# Patient Record
Sex: FEMALE | Race: WHITE | URBAN - METROPOLITAN AREA
[De-identification: names, ages, dates, MRNs, and addresses within clinical notes are randomized per-mention and may not be internally consistent; named-entity substitution may affect disease eponyms.]

---

## 2018-06-08 ENCOUNTER — POST-OPERATIVE VISIT (OUTPATIENT)
Dept: URBAN - METROPOLITAN AREA CLINIC 88 | Facility: CLINIC | Age: 78
End: 2018-06-08

## 2018-06-08 PROCEDURE — 99024 POSTOP FOLLOW-UP VISIT: CPT | Performed by: OPTOMETRIST

## 2018-06-08 ASSESSMENT — INTRAOCULAR PRESSURE
OD: 13
OS: 14

## 2018-06-22 ENCOUNTER — POST-OPERATIVE VISIT (OUTPATIENT)
Dept: URBAN - METROPOLITAN AREA CLINIC 88 | Facility: CLINIC | Age: 78
End: 2018-06-22

## 2018-06-22 DIAGNOSIS — Z09 ENCNTR FOR F/U EXAM AFT TRTMT FOR COND OTH THAN MALIG NEOPLM: Primary | ICD-10-CM

## 2018-06-22 PROCEDURE — 99024 POSTOP FOLLOW-UP VISIT: CPT | Performed by: OPTOMETRIST

## 2018-06-22 ASSESSMENT — INTRAOCULAR PRESSURE
OS: 14
OD: 14

## 2018-06-22 ASSESSMENT — VISUAL ACUITY
OD: 20/60
OS: 20/30+2

## 2018-07-26 ENCOUNTER — OFFICE VISIT (OUTPATIENT)
Dept: URBAN - METROPOLITAN AREA CLINIC 88 | Facility: CLINIC | Age: 78
End: 2018-07-26
Payer: COMMERCIAL

## 2018-07-26 DIAGNOSIS — H02.052 TRICHIASIS WITHOUT ENTROPION RIGHT LOWER EYELID: Primary | ICD-10-CM

## 2018-07-26 PROCEDURE — 67820 REVISE EYELASHES: CPT | Performed by: OPTOMETRIST

## 2018-07-26 ASSESSMENT — INTRAOCULAR PRESSURE
OD: 12
OS: 12

## 2018-07-26 NOTE — IMPRESSION/PLAN
Impression: Trichiasis without entropion right lower eyelid: H02.052. Plan: Discussed status with patient. With consent removal of lashes in office with forceps and without incident. 1 gtt Ofloxacin OD in office today. No complications observed. RTC if dry, redness or irritation experienced.

## 2018-09-27 ENCOUNTER — OFFICE VISIT (OUTPATIENT)
Dept: URBAN - METROPOLITAN AREA CLINIC 88 | Facility: CLINIC | Age: 78
End: 2018-09-27
Payer: COMMERCIAL

## 2018-09-27 DIAGNOSIS — H43.393 OTHER VITREOUS OPACITIES, BILATERAL: ICD-10-CM

## 2018-09-27 DIAGNOSIS — H52.13 MYOPIA, BILATERAL: ICD-10-CM

## 2018-09-27 DIAGNOSIS — H01.8 OTHER SPECIFIED INFLAMMATIONS OF EYELID: ICD-10-CM

## 2018-09-27 PROCEDURE — 99214 OFFICE O/P EST MOD 30 MIN: CPT | Performed by: OPTOMETRIST

## 2018-09-27 PROCEDURE — 92134 CPTRZ OPH DX IMG PST SGM RTA: CPT | Performed by: OPTOMETRIST

## 2018-09-27 ASSESSMENT — INTRAOCULAR PRESSURE
OS: 12
OD: 12

## 2018-09-27 ASSESSMENT — VISUAL ACUITY
OS: 20/25
OD: 20/50

## 2018-09-27 NOTE — IMPRESSION/PLAN
Impression: Other specified inflammations of eyelid: H01.8. Plan: Discussed cause of ocular irritation with patient in detail. Lid hygiene to be performed with baby shampoo and a soft cloth to clean the lash and lid margin. Patient understands this is a chronic condition that will require daily lid cleaning.

## 2018-09-27 NOTE — IMPRESSION/PLAN
Impression: Myopia, bilateral: H52.13. Plan: Reviewed refractive prescription in detail with patient and need for glasses to improve vision. Release spectacle prescription at this time.  MVD form filled out at patient request.

## 2018-09-27 NOTE — IMPRESSION/PLAN
Impression: Puckering of macula, right eye: H35.371. Plan: A detailed explanation of the condition was provided to the patient. Monitor at this time as surgery is not recommended. OCT macula OU performed today, ERM OD. Discussed referral to retina, pt deferred today. Patient knows to return to clinic if vision begins to decrease prior to their next scheduled examination.

## 2019-04-26 ENCOUNTER — OFFICE VISIT (OUTPATIENT)
Dept: URBAN - METROPOLITAN AREA CLINIC 88 | Facility: CLINIC | Age: 79
End: 2019-04-26
Payer: COMMERCIAL

## 2019-04-26 PROCEDURE — 92134 CPTRZ OPH DX IMG PST SGM RTA: CPT | Performed by: OPTOMETRIST

## 2019-04-26 PROCEDURE — 99214 OFFICE O/P EST MOD 30 MIN: CPT | Performed by: OPTOMETRIST

## 2019-04-26 ASSESSMENT — INTRAOCULAR PRESSURE
OS: 13
OD: 13

## 2019-04-26 NOTE — IMPRESSION/PLAN
Impression: Puckering of macula, right eye: H35.371. Plan: A detailed explanation of the condition was provided to the patient. Monitor at this time as surgery is not recommended. OCT macula OU performed today, ERM OD. Discussed referral to retina, pt deferred today. Patient knows to return to clinic if vision begins to decrease prior to their next scheduled examination.  RTC 9 months for dilated exam and OCT macula and ON

## 2020-03-10 ENCOUNTER — OFFICE VISIT (OUTPATIENT)
Dept: URBAN - METROPOLITAN AREA CLINIC 88 | Facility: CLINIC | Age: 80
End: 2020-03-10
Payer: COMMERCIAL

## 2020-03-10 DIAGNOSIS — H40.013 OPEN ANGLE WITH BORDERLINE FINDINGS, LOW RISK, BILATERAL: ICD-10-CM

## 2020-03-10 DIAGNOSIS — H43.813 VITREOUS DEGENERATION, BILATERAL: ICD-10-CM

## 2020-03-10 PROCEDURE — 92134 CPTRZ OPH DX IMG PST SGM RTA: CPT | Performed by: OPTOMETRIST

## 2020-03-10 PROCEDURE — 99214 OFFICE O/P EST MOD 30 MIN: CPT | Performed by: OPTOMETRIST

## 2020-03-10 ASSESSMENT — INTRAOCULAR PRESSURE
OD: 14
OS: 14

## 2020-03-10 NOTE — IMPRESSION/PLAN
Impression: Open angle with borderline findings, low risk, bilateral: H40.013. Plan: Discussed status of examination with patient. OCT ON today, thin superior stable vs 2010. Recommend glaucoma workup with VF 24-2, and Pachy. Patient understands risk associated with condition and need for monitoring.

## 2020-04-20 NOTE — IMPRESSION/PLAN
Impression: Puckering of macula, right eye: H35.371. Plan: A detailed explanation of the condition was provided to the patient. Monitor at this time as surgery is not recommended. OCT macula OU performed today, ERM OD. Discussed referral to retina, pt deferred today. Patient knows to return to clinic if vision begins to decrease prior to their next scheduled examination.  RTC 1 year for dilated exam and OCT macula and ON Ambulatory

## 2020-05-29 ENCOUNTER — OFFICE VISIT (OUTPATIENT)
Dept: URBAN - METROPOLITAN AREA CLINIC 88 | Facility: CLINIC | Age: 80
End: 2020-05-29
Payer: COMMERCIAL

## 2020-05-29 PROCEDURE — 92020 GONIOSCOPY: CPT | Performed by: OPTOMETRIST

## 2020-05-29 PROCEDURE — 99213 OFFICE O/P EST LOW 20 MIN: CPT | Performed by: OPTOMETRIST

## 2020-05-29 PROCEDURE — 92133 CPTRZD OPH DX IMG PST SGM ON: CPT | Performed by: OPTOMETRIST

## 2020-05-29 PROCEDURE — 76514 ECHO EXAM OF EYE THICKNESS: CPT | Performed by: OPTOMETRIST

## 2020-05-29 PROCEDURE — 92083 EXTENDED VISUAL FIELD XM: CPT | Performed by: OPTOMETRIST

## 2020-05-29 RX ORDER — LATANOPROST 50 UG/ML
0.005 % SOLUTION OPHTHALMIC
Qty: 10 | Refills: 0 | Status: INACTIVE
Start: 2020-05-29 | End: 2021-02-01

## 2020-05-29 RX ORDER — LATANOPROST 50 UG/ML
0.005 % SOLUTION OPHTHALMIC
Qty: 1 | Refills: 1 | Status: INACTIVE
Start: 2020-05-29 | End: 2020-05-29

## 2020-05-29 ASSESSMENT — INTRAOCULAR PRESSURE
OD: 15
OS: 11
OS: 15
OD: 11

## 2020-05-29 ASSESSMENT — VISUAL ACUITY
OS: 20/25
OD: 20/40

## 2020-05-29 NOTE — IMPRESSION/PLAN
Impression: Presence of pseudophakia: Z96.1. Plan: Monitor, stable. Glasses Rx at next visit when IOP checked.

## 2020-05-29 NOTE — IMPRESSION/PLAN
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 Plan: Discussed status of examination with patient. OCT ON today, thin superior. VF 24-2 inf loss with large ERM (difficult to determine stability secondary to retina status) OD, shallow nasal step pattern OS. Repeat VF needed. Pachy, thin OU. To decrease risk will start Latanoprost QHS OU (potential side effects discussed). Target IOP 10 OU. Patient understands risk associated with condition and need for monitoring.

## 2020-06-25 ENCOUNTER — OFFICE VISIT (OUTPATIENT)
Dept: URBAN - METROPOLITAN AREA CLINIC 88 | Facility: CLINIC | Age: 80
End: 2020-06-25
Payer: COMMERCIAL

## 2020-06-25 DIAGNOSIS — Z96.1 PRESENCE OF PSEUDOPHAKIA: ICD-10-CM

## 2020-06-25 PROCEDURE — 92012 INTRM OPH EXAM EST PATIENT: CPT | Performed by: OPTOMETRIST

## 2020-06-25 ASSESSMENT — INTRAOCULAR PRESSURE
OD: 13
OS: 9
OS: 13
OD: 9

## 2020-06-25 ASSESSMENT — VISUAL ACUITY
OS: 20/30
OD: 20/50

## 2020-06-25 NOTE — IMPRESSION/PLAN
Impression: Low-tension glaucoma, right eye, severe stage: H07.5035. Plan: Discussed status of examination with patient. OCT ON, thin superior. VF 24-2 inf loss with large ERM (difficult to determine stability secondary to retina status) OD, shallow nasal step pattern OS. Repeat VF needed. Pachy, thin OU. To decrease risk will continue Latanoprost QHS OU target IOP 10 OU, met today. Patient understands risk associated with condition and need for monitoring.

## 2020-08-13 ENCOUNTER — OFFICE VISIT (OUTPATIENT)
Dept: URBAN - METROPOLITAN AREA CLINIC 88 | Facility: CLINIC | Age: 80
End: 2020-08-13
Payer: COMMERCIAL

## 2020-08-13 PROCEDURE — 99213 OFFICE O/P EST LOW 20 MIN: CPT | Performed by: OPTOMETRIST

## 2020-08-13 ASSESSMENT — INTRAOCULAR PRESSURE
OS: 9
OD: 9

## 2020-08-13 NOTE — IMPRESSION/PLAN
Impression: Low-tension glaucoma, right eye, severe stage: K17.8847. Plan: Discussed status of examination with patient. OCT ON, thin superior. VF 24-2 inf loss with large ERM (difficult to determine stability secondary to retina status) OD, shallow nasal step pattern OS. Repeat VF needed. Pachy, thin OU. To decrease risk will continue Latanoprost QHS OU target IOP 10 OU, met today. Patient understands risk associated with condition and need for monitoring.  MVD form filled out at pt request.

## 2020-11-20 ENCOUNTER — OFFICE VISIT (OUTPATIENT)
Dept: URBAN - METROPOLITAN AREA CLINIC 88 | Facility: CLINIC | Age: 80
End: 2020-11-20
Payer: COMMERCIAL

## 2020-11-20 PROCEDURE — 99213 OFFICE O/P EST LOW 20 MIN: CPT | Performed by: OPTOMETRIST

## 2020-11-20 PROCEDURE — 92134 CPTRZ OPH DX IMG PST SGM RTA: CPT | Performed by: OPTOMETRIST

## 2020-11-20 ASSESSMENT — INTRAOCULAR PRESSURE
OD: 9
OD: 10
OS: 11
OS: 9

## 2020-11-20 NOTE — IMPRESSION/PLAN
Impression: Low-tension glaucoma, right eye, severe stage: E01.8039. Plan: Discussed status of examination with patient. OCT ON, thin superior. VF 24-2 inf loss with large ERM (difficult to determine stability secondary to retina status) OD, shallow nasal step pattern OS. Repeat VF needed. Pachy, thin OU. To decrease risk will continue Latanoprost QHS OU target IOP 10 OU, met today. Patient understands risk associated with condition and need for monitoring.

## 2020-11-20 NOTE — IMPRESSION/PLAN
Impression: Puckering of macula, right eye: H35.371. Plan: A detailed explanation of the condition was provided to the patient. Monitor at this time as surgery is not recommended. OCT macula OU performed today, ERM OD slight progression, no symptoms. Discussed referral to retina, pt deferred today. Patient knows to return to clinic if vision begins to decrease prior to their next scheduled examination.

## 2021-02-26 ENCOUNTER — OFFICE VISIT (OUTPATIENT)
Dept: URBAN - METROPOLITAN AREA CLINIC 88 | Facility: CLINIC | Age: 81
End: 2021-02-26
Payer: COMMERCIAL

## 2021-02-26 PROCEDURE — 92133 CPTRZD OPH DX IMG PST SGM ON: CPT | Performed by: OPTOMETRIST

## 2021-02-26 PROCEDURE — 99214 OFFICE O/P EST MOD 30 MIN: CPT | Performed by: OPTOMETRIST

## 2021-02-26 ASSESSMENT — INTRAOCULAR PRESSURE
OS: 11
OD: 11

## 2021-02-26 NOTE — IMPRESSION/PLAN
Impression: Low-tension glaucoma, right eye, severe stage: Q33.7580. Plan: Discussed status of examination with patient. OCT ON today, thin superior stable vs last. VF 24-2 inf loss with large ERM (difficult to determine stability secondary to retina status) OD, shallow nasal step pattern OS. Repeat VF needed. Pachy, thin OU. To decrease risk will continue Latanoprost QHS OU target IOP 10 OU, borderline met today. Patient understands risk associated with condition and need for monitoring.

## 2021-02-26 NOTE — IMPRESSION/PLAN
Impression: Puckering of macula, right eye: H35.371. Plan: A detailed explanation of the condition was provided to the patient. Monitor at this time as surgery is not recommended. H/O OCT macula OU, ERM OD slight progression, no symptoms. Discussed referral to retina, pt deferred today. Patient knows to return to clinic if vision begins to decrease prior to their next scheduled examination.

## 2021-07-02 ENCOUNTER — OFFICE VISIT (OUTPATIENT)
Dept: URBAN - METROPOLITAN AREA CLINIC 88 | Facility: CLINIC | Age: 81
End: 2021-07-02
Payer: COMMERCIAL

## 2021-07-02 DIAGNOSIS — H40.1222 LOW-TENSION GLAUCOMA, LEFT EYE, MODERATE STAGE: ICD-10-CM

## 2021-07-02 DIAGNOSIS — H35.371 PUCKERING OF MACULA, RIGHT EYE: ICD-10-CM

## 2021-07-02 PROCEDURE — 92083 EXTENDED VISUAL FIELD XM: CPT | Performed by: OPTOMETRIST

## 2021-07-02 PROCEDURE — 99213 OFFICE O/P EST LOW 20 MIN: CPT | Performed by: OPTOMETRIST

## 2021-07-02 ASSESSMENT — INTRAOCULAR PRESSURE
OD: 9
OS: 11

## 2021-07-02 NOTE — IMPRESSION/PLAN
Impression: Low-tension glaucoma, right eye, severe stage: B30.5965. Plan: Discussed status of examination with patient. OCT ON, thin superior stable vs last. VF 24-2 today, poor reliability and diffuse loss OD and OS, questionable OU (difficult to determine stability secondary to retina status and reliability) OD, shallow nasal step pattern OS. Repeat VF needed, if poor reliability more reliance on objective testing in the future. Pachy, thin OU. To decrease risk will continue Latanoprost QHS OU target IOP 10 OU, met OD, borderline (11) OS today. Patient understands risk associated with condition and need for monitoring.

## 2021-10-28 ENCOUNTER — OFFICE VISIT (OUTPATIENT)
Dept: URBAN - METROPOLITAN AREA CLINIC 88 | Facility: CLINIC | Age: 81
End: 2021-10-28
Payer: COMMERCIAL

## 2021-10-28 DIAGNOSIS — H40.1213 LOW-TENSION GLAUCOMA, RIGHT EYE, SEVERE STAGE: Primary | ICD-10-CM

## 2021-10-28 PROCEDURE — 92133 CPTRZD OPH DX IMG PST SGM ON: CPT | Performed by: OPTOMETRIST

## 2021-10-28 PROCEDURE — 92083 EXTENDED VISUAL FIELD XM: CPT | Performed by: OPTOMETRIST

## 2021-10-28 PROCEDURE — 99213 OFFICE O/P EST LOW 20 MIN: CPT | Performed by: OPTOMETRIST

## 2021-10-28 ASSESSMENT — INTRAOCULAR PRESSURE
OS: 10
OD: 7

## 2021-10-28 NOTE — IMPRESSION/PLAN
Impression: Puckering of macula, right eye: H35.371. Plan: A detailed explanation of the condition was provided to the patient. Monitor at this time as surgery is not recommended. OCT macula OU today, ERM OD slight progression, no symptoms. Discussed referral to retina, pt deferred today. Patient knows to return to clinic if vision begins to decrease prior to their next scheduled examination.

## 2021-10-28 NOTE — IMPRESSION/PLAN
Impression: Low-tension glaucoma, right eye, severe stage: J14.1532. Plan: Discussed status of examination with patient. OCT ON today, stable vs last. VF 24-2 today, diffuse loss questionable reliability OU. Repeat VF needed, poor reliability more reliance on objective testing is necessary in the future. Pachy, thin OU. To decrease risk will continue Latanoprost QHS OU target IOP 10 OU, met OU today. Patient understands risk associated with condition and need for monitoring.

## 2022-02-24 ENCOUNTER — OFFICE VISIT (OUTPATIENT)
Dept: URBAN - METROPOLITAN AREA CLINIC 88 | Facility: CLINIC | Age: 82
End: 2022-02-24
Payer: MEDICARE

## 2022-02-24 DIAGNOSIS — H11.041 PERIPHERAL PTERYGIUM, STATIONARY, RIGHT EYE: ICD-10-CM

## 2022-02-24 PROCEDURE — 99213 OFFICE O/P EST LOW 20 MIN: CPT | Performed by: OPTOMETRIST

## 2022-02-24 ASSESSMENT — INTRAOCULAR PRESSURE
OD: 8
OS: 10

## 2022-02-24 NOTE — IMPRESSION/PLAN
Impression: Low-tension glaucoma, right eye, severe stage: K54.2909. Plan: Discussed status of examination with patient. OCT ON, stable vs last. VF 24-2, diffuse loss questionable reliability OU. Repeat VF needed, poor reliability more reliance on objective testing is necessary in the future. Pachy, thin OU. To decrease risk will continue Latanoprost QHS OU target IOP 10 OU, met OU today. Patient understands risk associated with condition and need for monitoring.

## 2022-02-24 NOTE — IMPRESSION/PLAN
Impression: Puckering of macula, right eye: H35.371. Plan: A detailed explanation of the condition was provided to the patient. Monitor at this time as surgery is not recommended. OCT macula OU at prior visit, ERM OD slight progression, no symptoms. Discussed referral to retina, pt deferred today. Patient knows to return to clinic if vision begins to decrease prior to their next scheduled examination.

## 2022-07-28 ENCOUNTER — OFFICE VISIT (OUTPATIENT)
Dept: URBAN - METROPOLITAN AREA CLINIC 88 | Facility: CLINIC | Age: 82
End: 2022-07-28
Payer: MEDICARE

## 2022-07-28 DIAGNOSIS — Z96.1 PRESENCE OF PSEUDOPHAKIA: ICD-10-CM

## 2022-07-28 DIAGNOSIS — H35.371 PUCKERING OF MACULA, RIGHT EYE: ICD-10-CM

## 2022-07-28 DIAGNOSIS — H11.041 PERIPHERAL PTERYGIUM, STATIONARY, RIGHT EYE: ICD-10-CM

## 2022-07-28 DIAGNOSIS — H40.1222 LOW-TENSION GLAUCOMA, LEFT EYE, MODERATE STAGE: ICD-10-CM

## 2022-07-28 DIAGNOSIS — H40.1213 LOW-TENSION GLAUCOMA, RIGHT EYE, SEVERE STAGE: Primary | ICD-10-CM

## 2022-07-28 PROCEDURE — 92133 CPTRZD OPH DX IMG PST SGM ON: CPT | Performed by: OPTOMETRIST

## 2022-07-28 PROCEDURE — 92134 CPTRZ OPH DX IMG PST SGM RTA: CPT | Performed by: OPTOMETRIST

## 2022-07-28 PROCEDURE — 99213 OFFICE O/P EST LOW 20 MIN: CPT | Performed by: OPTOMETRIST

## 2022-07-28 ASSESSMENT — INTRAOCULAR PRESSURE
OS: 11
OD: 9

## 2022-07-28 NOTE — IMPRESSION/PLAN
Impression: Puckering of macula, right eye: H35.371. Plan: A detailed explanation of the condition was provided to the patient. Monitor at this time as surgery is not recommended. OCT macula OU, ERM OD questionable progression, no symptoms. Discussed referral to retina, pt deferred today. Patient knows to return to clinic if vision begins to decrease prior to their next scheduled examination.

## 2022-07-28 NOTE — IMPRESSION/PLAN
Impression: Low-tension glaucoma, right eye, severe stage: I72.5057. Plan: Discussed status of examination with patient. OCT ON today, stable vs last. H/O VF 24-2, diffuse loss questionable reliability OU. Repeat VF needed, poor reliability more reliance on objective testing is necessary in the future. Pachy, thin OU. To decrease risk will continue Latanoprost QHS OU target IOP 10 OU, met OU today. Patient understands risk associated with condition and need for monitoring.

## 2022-11-30 ENCOUNTER — OFFICE VISIT (OUTPATIENT)
Dept: URBAN - METROPOLITAN AREA CLINIC 88 | Facility: CLINIC | Age: 82
End: 2022-11-30
Payer: MEDICARE

## 2022-11-30 DIAGNOSIS — H40.1213 LOW-TENSION GLAUCOMA, RIGHT EYE, SEVERE STAGE: Primary | ICD-10-CM

## 2022-11-30 DIAGNOSIS — H40.1222 LOW-TENSION GLAUCOMA, LEFT EYE, MODERATE STAGE: ICD-10-CM

## 2022-11-30 DIAGNOSIS — Z96.1 PRESENCE OF PSEUDOPHAKIA: ICD-10-CM

## 2022-11-30 DIAGNOSIS — H11.041 PERIPHERAL PTERYGIUM, STATIONARY, RIGHT EYE: ICD-10-CM

## 2022-11-30 PROCEDURE — 99213 OFFICE O/P EST LOW 20 MIN: CPT | Performed by: OPTOMETRIST

## 2022-11-30 RX ORDER — LATANOPROST 50 UG/ML
0.005 % SOLUTION OPHTHALMIC
Qty: 10 | Refills: 3 | Status: ACTIVE
Start: 2022-11-30

## 2022-11-30 ASSESSMENT — INTRAOCULAR PRESSURE
OD: 11
OS: 11

## 2022-11-30 NOTE — IMPRESSION/PLAN
Impression: Low-tension glaucoma, right eye, severe stage: M13.0626. Plan: Discussed status of examination with patient. H/O OCT ON, stable vs last. VF today, poor reliability  reliance on objective testing is necessary in the future. Pachy, thin OU. To decrease risk will continue Latanoprost QHS OU target IOP 10 OU, borderline met OU today. Patient understands risk associated with condition and need for monitoring.

## 2023-04-19 ENCOUNTER — OFFICE VISIT (OUTPATIENT)
Dept: URBAN - METROPOLITAN AREA CLINIC 88 | Facility: CLINIC | Age: 83
End: 2023-04-19
Payer: MEDICARE

## 2023-04-19 DIAGNOSIS — H35.371 PUCKERING OF MACULA, RIGHT EYE: ICD-10-CM

## 2023-04-19 DIAGNOSIS — H11.041 PERIPHERAL PTERYGIUM, STATIONARY, RIGHT EYE: ICD-10-CM

## 2023-04-19 DIAGNOSIS — H40.1213 LOW-TENSION GLAUCOMA, RIGHT EYE, SEVERE STAGE: Primary | ICD-10-CM

## 2023-04-19 DIAGNOSIS — Z96.1 PRESENCE OF PSEUDOPHAKIA: ICD-10-CM

## 2023-04-19 DIAGNOSIS — H43.813 VITREOUS DEGENERATION, BILATERAL: ICD-10-CM

## 2023-04-19 DIAGNOSIS — H40.1222 LOW-TENSION GLAUCOMA, LEFT EYE, MODERATE STAGE: ICD-10-CM

## 2023-04-19 PROCEDURE — 99213 OFFICE O/P EST LOW 20 MIN: CPT | Performed by: OPTOMETRIST

## 2023-04-19 PROCEDURE — 92134 CPTRZ OPH DX IMG PST SGM RTA: CPT | Performed by: OPTOMETRIST

## 2023-04-19 PROCEDURE — 92133 CPTRZD OPH DX IMG PST SGM ON: CPT | Performed by: OPTOMETRIST

## 2023-04-19 ASSESSMENT — INTRAOCULAR PRESSURE
OS: 10
OD: 10

## 2023-04-19 NOTE — IMPRESSION/PLAN
Impression: Puckering of macula, right eye: H35.371. Plan: A detailed explanation of the condition was provided to the patient. Monitor at this time as surgery is not recommended based on lack of interested from patient. OCT macula OU, ERM OD  progression, no symptoms. Discussed referral to retina, pt deferred today. Patient knows to return to clinic if vision begins to decrease prior to their next scheduled examination.

## 2023-04-19 NOTE — IMPRESSION/PLAN
Impression: Low-tension glaucoma, right eye, severe stage: P51.7500. Plan: Discussed status of examination with patient. OCT ON, stable vs last. H/O VF, poor reliability  reliance on objective testing is necessary in the future. Pachy, thin OU. To decrease risk will continue Latanoprost QHS OU target IOP 10 OU, met OU today. Patient understands risk associated with condition and need for monitoring.

## 2023-08-17 ENCOUNTER — OFFICE VISIT (OUTPATIENT)
Dept: URBAN - METROPOLITAN AREA CLINIC 88 | Facility: CLINIC | Age: 83
End: 2023-08-17
Payer: MEDICARE

## 2023-08-17 DIAGNOSIS — H40.1222 LOW-TENSION GLAUCOMA, LEFT EYE, MODERATE STAGE: ICD-10-CM

## 2023-08-17 DIAGNOSIS — H11.041 PERIPHERAL PTERYGIUM, STATIONARY, RIGHT EYE: ICD-10-CM

## 2023-08-17 DIAGNOSIS — Z96.1 PRESENCE OF PSEUDOPHAKIA: ICD-10-CM

## 2023-08-17 DIAGNOSIS — H40.1213 LOW-TENSION GLAUCOMA, RIGHT EYE, SEVERE STAGE: Primary | ICD-10-CM

## 2023-08-17 PROCEDURE — 99213 OFFICE O/P EST LOW 20 MIN: CPT | Performed by: OPTOMETRIST

## 2023-08-17 ASSESSMENT — INTRAOCULAR PRESSURE
OS: 9
OD: 9

## 2023-08-17 ASSESSMENT — VISUAL ACUITY
OS: 20/40
OD: 20/50

## 2024-01-05 ENCOUNTER — OFFICE VISIT (OUTPATIENT)
Dept: URBAN - METROPOLITAN AREA CLINIC 88 | Facility: CLINIC | Age: 84
End: 2024-01-05
Payer: MEDICARE

## 2024-01-05 DIAGNOSIS — H40.1222 LOW-TENSION GLAUCOMA, LEFT EYE, MODERATE STAGE: ICD-10-CM

## 2024-01-05 DIAGNOSIS — H04.123 DRY EYE SYNDROME OF BILATERAL LACRIMAL GLANDS: ICD-10-CM

## 2024-01-05 DIAGNOSIS — H40.1213 LOW-TENSION GLAUCOMA, RIGHT EYE, SEVERE STAGE: Primary | ICD-10-CM

## 2024-01-05 DIAGNOSIS — H11.041 PERIPHERAL PTERYGIUM, STATIONARY, RIGHT EYE: ICD-10-CM

## 2024-01-05 DIAGNOSIS — Z96.1 PRESENCE OF PSEUDOPHAKIA: ICD-10-CM

## 2024-01-05 PROCEDURE — 92133 CPTRZD OPH DX IMG PST SGM ON: CPT | Performed by: OPTOMETRIST

## 2024-01-05 PROCEDURE — 99213 OFFICE O/P EST LOW 20 MIN: CPT | Performed by: OPTOMETRIST

## 2024-01-05 ASSESSMENT — INTRAOCULAR PRESSURE
OD: 10
OS: 11

## 2024-07-18 ENCOUNTER — OFFICE VISIT (OUTPATIENT)
Dept: URBAN - METROPOLITAN AREA CLINIC 88 | Facility: CLINIC | Age: 84
End: 2024-07-18
Payer: MEDICARE

## 2024-07-18 DIAGNOSIS — H40.1213 LOW-TENSION GLAUCOMA, RIGHT EYE, SEVERE STAGE: Primary | ICD-10-CM

## 2024-07-18 DIAGNOSIS — Z96.1 PRESENCE OF PSEUDOPHAKIA: ICD-10-CM

## 2024-07-18 DIAGNOSIS — H35.371 PUCKERING OF MACULA, RIGHT EYE: ICD-10-CM

## 2024-07-18 DIAGNOSIS — H40.1222 LOW-TENSION GLAUCOMA, LEFT EYE, MODERATE STAGE: ICD-10-CM

## 2024-07-18 DIAGNOSIS — H11.041 PERIPHERAL PTERYGIUM, STATIONARY, RIGHT EYE: ICD-10-CM

## 2024-07-18 PROCEDURE — 92014 COMPRE OPH EXAM EST PT 1/>: CPT | Performed by: OPTOMETRIST

## 2024-07-18 PROCEDURE — 92134 CPTRZ OPH DX IMG PST SGM RTA: CPT | Performed by: OPTOMETRIST

## 2024-07-18 ASSESSMENT — INTRAOCULAR PRESSURE
OD: 10
OS: 11

## 2024-08-30 ENCOUNTER — OFFICE VISIT (OUTPATIENT)
Dept: URBAN - METROPOLITAN AREA CLINIC 88 | Facility: CLINIC | Age: 84
End: 2024-08-30
Payer: COMMERCIAL

## 2024-08-30 DIAGNOSIS — H35.371 PUCKERING OF MACULA, RIGHT EYE: ICD-10-CM

## 2024-08-30 DIAGNOSIS — H52.13 MYOPIA, BILATERAL: Primary | ICD-10-CM

## 2024-08-30 DIAGNOSIS — H40.1213 LOW-TENSION GLAUCOMA, RIGHT EYE, SEVERE STAGE: ICD-10-CM

## 2024-08-30 PROCEDURE — 92014 COMPRE OPH EXAM EST PT 1/>: CPT | Performed by: OPTOMETRIST

## 2024-08-30 ASSESSMENT — VISUAL ACUITY
OS: 20/30
OD: 20/60

## 2025-05-23 ENCOUNTER — OFFICE VISIT (OUTPATIENT)
Dept: URBAN - METROPOLITAN AREA CLINIC 88 | Facility: CLINIC | Age: 85
End: 2025-05-23
Payer: MEDICARE

## 2025-05-23 DIAGNOSIS — H35.371 PUCKERING OF MACULA, RIGHT EYE: ICD-10-CM

## 2025-05-23 DIAGNOSIS — Z96.1 PRESENCE OF PSEUDOPHAKIA: ICD-10-CM

## 2025-05-23 DIAGNOSIS — H40.1222 LOW-TENSION GLAUCOMA, LEFT EYE, MODERATE STAGE: ICD-10-CM

## 2025-05-23 DIAGNOSIS — H40.1213 LOW-TENSION GLAUCOMA, RIGHT EYE, SEVERE STAGE: Primary | ICD-10-CM

## 2025-05-23 PROCEDURE — 99213 OFFICE O/P EST LOW 20 MIN: CPT | Performed by: OPTOMETRIST

## 2025-05-23 PROCEDURE — 92133 CPTRZD OPH DX IMG PST SGM ON: CPT | Performed by: OPTOMETRIST

## 2025-05-23 RX ORDER — LATANOPROST 50 UG/ML
0.005 % SOLUTION OPHTHALMIC
Qty: 7.5 | Refills: 3 | Status: ACTIVE
Start: 2025-05-23

## 2025-05-23 ASSESSMENT — INTRAOCULAR PRESSURE
OS: 7
OD: 7